# Patient Record
Sex: MALE | Race: WHITE | URBAN - METROPOLITAN AREA
[De-identification: names, ages, dates, MRNs, and addresses within clinical notes are randomized per-mention and may not be internally consistent; named-entity substitution may affect disease eponyms.]

---

## 2017-01-13 ENCOUNTER — MEDICAL CORRESPONDENCE (OUTPATIENT)
Dept: HEALTH INFORMATION MANAGEMENT | Facility: CLINIC | Age: 82
End: 2017-01-13

## 2017-01-20 ENCOUNTER — OFFICE VISIT (OUTPATIENT)
Dept: SURGERY | Facility: CLINIC | Age: 82
End: 2017-01-20
Payer: COMMERCIAL

## 2017-01-20 VITALS
WEIGHT: 175 LBS | BODY MASS INDEX: 25.05 KG/M2 | HEIGHT: 70 IN | SYSTOLIC BLOOD PRESSURE: 158 MMHG | DIASTOLIC BLOOD PRESSURE: 72 MMHG

## 2017-01-20 DIAGNOSIS — K40.90 LEFT INGUINAL HERNIA: Primary | ICD-10-CM

## 2017-01-20 PROCEDURE — 99204 OFFICE O/P NEW MOD 45 MIN: CPT | Performed by: SURGERY

## 2017-01-20 NOTE — PROGRESS NOTES
General Surgery Clinic Consultation      HPI:  Ismael is a 82 year old male who presents for consultation reffered by Dr. Minaya who presents for evaluation of left groin bulge.  He first a bulge in . At that time he had an ultrasound and then an MRI and was diagnosed with an AAA. This was treated with an EVAR. He continued to have a left groin bulge but it was asymptomatic until the last few weeks. He now has a bulge that is present all day and is much larger. He is unable to reduce it manually but when he lays down for a significant period it will reduce on its own. He has some mild discomfort associated with the bulge. He has also been having issues with constipation and feels it is related to his hernia as when it is reduced he is able to more easily have a bowel movement. He denies history of obstructive symptoms. He has had intermittent issues with abdominal pain, that resolve when lying flat.     Constipation: Yes  Colonoscopy: Yes   Dysuria: No  Cough: No  Diabetes: No  Current smoker: No  Employment does not require heavy lifting.    Past Medical History:   has a past medical history of Abdominal aortic aneurysm (AAA) (H) and Hypertension.    Past Surgical History:  Past Surgical History   Procedure Laterality Date     Cardiac surgery       Stent        Additional operations: EVAR, vasectomy    Social History:  Social History     Social History     Marital Status:      Spouse Name: N/A     Number of Children: N/A     Years of Education: N/A     Occupational History     Not on file.     Social History Main Topics     Smoking status: Former Smoker     Smokeless tobacco: Not on file     Alcohol Use: Not on file     Drug Use: Not on file     Sexual Activity: Not on file     Other Topics Concern     Not on file     Social History Narrative     No narrative on file    Drinks etoh socially. Retired, used to work as a     Family History:  Reviewed - mother  at age 93, father  from ACS at  72  Hernias: No    ROS:  The 10 point review of systems is negative other than noted in the HPI and above.    PE:    General- Well-developed, well-nourished, patient able to get up on table without difficulty.  HEENT- Normocephalic and atraumatic. Pupils equal and round.  Mucous membranes moist.  Sclera are nonicteric.  Neck- No lymphadenopathy or masses   Respirations- are regular and non labored  Abdomen- abdomen is soft without significant tenderness, masses, organomegaly or guarding  Hernia-  Large left inguinal hernia, not easily reducible, tender with palpation              Right inguinal exam is normal              Testes are descended bilaterally and normal    Assessment: Primary, partially reducible left inguinal hernia.    Plan:    We have discussed observation, reduction techniques and importance, incarceration and strangulation signs, symptoms and importance as well as need to seek emergency treatment.      We have had a detailed discussion regarding the nature of hernias, surgery, indications, alternatives, risks, benefits, incisions, scarring, anesthesia, recovery, mesh, infection, bleeding, numbness, nerve damage and chronic pain, testicular loss, hernia recurrence, lifting and activity limitations after surgery.  All questions have been answered to the best of my ability.    We will schedule surgery at the patient's convenience.He will hold his aspirin and advil for a week prior to surgery. Given his symptoms I recommend more urgent repair of his left hernia. We will plan on an open repair given the longstanding nature of the hernia and size.     Time spent with the patient with greater that 50% of the time in discussion was 40 minutes.     Yue Lindquist MD    Please route or send letter to:  Primary Care Provider (PCP) and Referring Provider          HPI      ROS      Physical Exam

## 2017-01-20 NOTE — Clinical Note
2017    General Surgery Clinic Consultation    RE:  Ismael Lombardo-:  [birth year]      HPI:  Ismael is a 82 year old male who presents for consultation reffered by Dr. Minaya who presents for evaluation of left groin bulge.  He first a bulge in . At that time he had an ultrasound and then an MRI and was diagnosed with an AAA. This was treated with an EVAR. He continued to have a left groin bulge but it was asymptomatic until the last few weeks. He now has a bulge that is present all day and is much larger. He is unable to reduce it manually but when he lays down for a significant period it will reduce on its own. He has some mild discomfort associated with the bulge. He has also been having issues with constipation and feels it is related to his hernia as when it is reduced he is able to more easily have a bowel movement. He denies history of obstructive symptoms. He has had intermittent issues with abdominal pain, that resolve when lying flat.     Constipation: Yes  Colonoscopy: Yes   Dysuria: No  Cough: No  Diabetes: No  Current smoker: No  Employment does not require heavy lifting.    Past Medical History:  Has a past medical history of Abdominal aortic aneurysm (AAA) (H) and Hypertension.    Family History:  Reviewed - mother  at age 93, father  from ACS at 72  Hernias: No    ROS:  The 10 point review of systems is negative other than noted in the HPI and above.    PE:    General- Well-developed, well-nourished, patient able to get up on table without difficulty.  HEENT- Normocephalic and atraumatic. Pupils equal and round.  Mucous membranes moist.  Sclera are nonicteric.  Neck- No lymphadenopathy or masses   Respirations- are regular and non labored  Abdomen- abdomen is soft without significant tenderness, masses, organomegaly or guarding  Hernia-  Large left inguinal hernia, not easily reducible, tender with palpation              Right inguinal exam is normal              Testes are  descended bilaterally and normal    Assessment: Primary, partially reducible left inguinal hernia.    Plan:    We have discussed observation, reduction techniques and importance, incarceration and strangulation signs, symptoms and importance as well as need to seek emergency treatment.      We have had a detailed discussion regarding the nature of hernias, surgery, indications, alternatives, risks, benefits, incisions, scarring, anesthesia, recovery, mesh, infection, bleeding, numbness, nerve damage and chronic pain, testicular loss, hernia recurrence, lifting and activity limitations after surgery.  All questions have been answered to the best of my ability.    We will schedule surgery at the patient's convenience.He will hold his aspirin and advil for a week prior to surgery. Given his symptoms I recommend more urgent repair of his left hernia. We will plan on an open repair given the longstanding nature of the hernia and size.         Yue Lindquist MD

## 2017-01-20 NOTE — MR AVS SNAPSHOT
"              After Visit Summary   2017    Ismael Lombardo    MRN: [id number]           Patient Information     Date Of Birth          3/22/1934        Visit Information        Provider Department      2017 2:00 PM Yue Lindquist MD [facility]      Today's Diagnoses     Left inguinal hernia    -  1        Follow-ups after your visit        Who to contact     If you have questions or need follow up information about today's clinic visit or your schedule please contact [facility] directly at [phone].  Normal or non-critical lab and imaging results will be communicated to you by [employer], letter or phone within 4 business days after the clinic has received the results. If you do not hear from us within 7 days, please contact the clinic through [employer] or phone. If you have a critical or abnormal lab result, we will notify you by phone as soon as possible.  Submit refill requests through [employer] or call your pharmacy and they will forward the refill request to us. Please allow 3 business days for your refill to be completed.          Additional Information About Your Visit        MyChart Information     [employer] lets you send messages to your doctor, view your test results, renew your prescriptions, schedule appointments and more. To sign up, go to [email or web address] . Click on \"Log in\" on the left side of the screen, which will take you to the Welcome page. Then click on \"Sign up Now\" on the right side of the page.     You will be asked to enter the access code listed below, as well as some personal information. Please follow the directions to create your username and password.     Your access code is: [email or web address]  Expires: 2017  2:41 PM     Your access code will  in 90 days. If you need help or a new code, please call your [place] clinic or [phone].        Care EveryWhere ID     This is your Care " "EveryWhere ID. This could be used by other organizations to access your [facility] medical records  [id number]        Your Vitals Were     Height BMI (Body Mass Index)                5' 10\" (1.778 m) 25.11 kg/m2           Blood Pressure from Last 3 Encounters:   01/20/17 158/72    Weight from Last 3 Encounters:   01/20/17 175 lb (79.379 kg)              Today, you had the following     No orders found for display       Primary Care Provider Office Phone # Fax #    Jem Minaya [phone]       [facility] [address] [place] [address]        Thank you!     Thank you for choosing [facility]  for your care. Our goal is always to provide you with excellent care. Hearing back from our patients is one way we can continue to improve our services. Please take a few minutes to complete the written survey that you may receive in the mail after your visit with us. Thank you!             Your Updated Medication List - Protect others around you: Learn how to safely use, store and throw away your medicines at www.disposemymeds.org.          This list is accurate as of: 1/20/17  2:41 PM.  Always use your most recent med list.                   Brand Name Dispense Instructions for use    ATENOLOL PO      Take 20 mg by mouth       ATORVASTATIN CALCIUM PO          LOSARTAN POTASSIUM PO          PREDNISONE PO            "

## 2017-01-24 ENCOUNTER — TRANSFERRED RECORDS (OUTPATIENT)
Dept: HEALTH INFORMATION MANAGEMENT | Facility: CLINIC | Age: 82
End: 2017-01-24

## 2017-01-25 ENCOUNTER — APPOINTMENT (OUTPATIENT)
Dept: SURGERY | Facility: PHYSICIAN GROUP | Age: 82
End: 2017-01-25
Payer: COMMERCIAL

## 2017-01-25 ENCOUNTER — ANESTHESIA (OUTPATIENT)
Dept: SURGERY | Facility: CLINIC | Age: 82
End: 2017-01-25
Payer: MEDICARE

## 2017-01-25 ENCOUNTER — SURGERY (OUTPATIENT)
Age: 82
End: 2017-01-25

## 2017-01-25 ENCOUNTER — ANESTHESIA EVENT (OUTPATIENT)
Dept: SURGERY | Facility: CLINIC | Age: 82
End: 2017-01-25
Payer: MEDICARE

## 2017-01-25 ENCOUNTER — HOSPITAL ENCOUNTER (OUTPATIENT)
Facility: CLINIC | Age: 82
Discharge: HOME | End: 2017-01-25
Attending: SURGERY | Admitting: SURGERY
Payer: MEDICARE

## 2017-01-25 VITALS
SYSTOLIC BLOOD PRESSURE: 144 MMHG | OXYGEN SATURATION: 96 % | BODY MASS INDEX: 25.11 KG/M2 | TEMPERATURE: 98.4 F | RESPIRATION RATE: 16 BRPM | DIASTOLIC BLOOD PRESSURE: 73 MMHG | WEIGHT: 175 LBS

## 2017-01-25 DIAGNOSIS — G89.18 ACUTE POST-OPERATIVE PAIN: ICD-10-CM

## 2017-01-25 DIAGNOSIS — K40.90 LEFT INGUINAL HERNIA: Primary | ICD-10-CM

## 2017-01-25 PROCEDURE — 36000052 ZZH SURGERY LEVEL 2 EA 15 ADDTL MIN: Performed by: SURGERY

## 2017-01-25 PROCEDURE — 25000125 ZZHC RX 250: Performed by: SURGERY

## 2017-01-25 PROCEDURE — 25000128 H RX IP 250 OP 636: Performed by: NURSE ANESTHETIST, CERTIFIED REGISTERED

## 2017-01-25 PROCEDURE — 27210995 ZZH RX 272: Performed by: SURGERY

## 2017-01-25 PROCEDURE — 37000008 ZZH ANESTHESIA TECHNICAL FEE, 1ST 30 MIN: Performed by: SURGERY

## 2017-01-25 PROCEDURE — 71000027 ZZH RECOVERY PHASE 2 EACH 15 MINS: Performed by: SURGERY

## 2017-01-25 PROCEDURE — 49505 PRP I/HERN INIT REDUC >5 YR: CPT | Performed by: SURGERY

## 2017-01-25 PROCEDURE — 36000050 ZZH SURGERY LEVEL 2 1ST 30 MIN: Performed by: SURGERY

## 2017-01-25 PROCEDURE — 71000012 ZZH RECOVERY PHASE 1 LEVEL 1 FIRST HR: Performed by: SURGERY

## 2017-01-25 PROCEDURE — C1781 MESH (IMPLANTABLE): HCPCS | Performed by: SURGERY

## 2017-01-25 PROCEDURE — 27210794 ZZH OR GENERAL SUPPLY STERILE: Performed by: SURGERY

## 2017-01-25 PROCEDURE — 37000009 ZZH ANESTHESIA TECHNICAL FEE, EACH ADDTL 15 MIN: Performed by: SURGERY

## 2017-01-25 PROCEDURE — 25800025 ZZH RX 258: Performed by: NURSE ANESTHETIST, CERTIFIED REGISTERED

## 2017-01-25 PROCEDURE — 25000125 ZZHC RX 250: Performed by: NURSE ANESTHETIST, CERTIFIED REGISTERED

## 2017-01-25 PROCEDURE — 40000170 ZZH STATISTIC PRE-PROCEDURE ASSESSMENT II: Performed by: SURGERY

## 2017-01-25 DEVICE — MESH ULTRAPRO 06X06": Type: IMPLANTABLE DEVICE | Site: INGUINAL | Status: FUNCTIONAL

## 2017-01-25 RX ORDER — EPHEDRINE SULFATE 50 MG/ML
INJECTION, SOLUTION INTRAMUSCULAR; INTRAVENOUS; SUBCUTANEOUS PRN
Status: DISCONTINUED | OUTPATIENT
Start: 2017-01-25 | End: 2017-01-25

## 2017-01-25 RX ORDER — PROPOFOL 10 MG/ML
INJECTION, EMULSION INTRAVENOUS CONTINUOUS PRN
Status: DISCONTINUED | OUTPATIENT
Start: 2017-01-25 | End: 2017-01-25

## 2017-01-25 RX ORDER — SODIUM CHLORIDE, SODIUM LACTATE, POTASSIUM CHLORIDE, CALCIUM CHLORIDE 600; 310; 30; 20 MG/100ML; MG/100ML; MG/100ML; MG/100ML
1000 INJECTION, SOLUTION INTRAVENOUS CONTINUOUS
Status: DISCONTINUED | OUTPATIENT
Start: 2017-01-25 | End: 2017-01-25 | Stop reason: HOSPADM

## 2017-01-25 RX ORDER — PROPOFOL 10 MG/ML
INJECTION, EMULSION INTRAVENOUS PRN
Status: DISCONTINUED | OUTPATIENT
Start: 2017-01-25 | End: 2017-01-25

## 2017-01-25 RX ORDER — SODIUM CHLORIDE, SODIUM LACTATE, POTASSIUM CHLORIDE, CALCIUM CHLORIDE 600; 310; 30; 20 MG/100ML; MG/100ML; MG/100ML; MG/100ML
INJECTION, SOLUTION INTRAVENOUS CONTINUOUS PRN
Status: DISCONTINUED | OUTPATIENT
Start: 2017-01-25 | End: 2017-01-25

## 2017-01-25 RX ORDER — PHYSOSTIGMINE SALICYLATE 1 MG/ML
1.2 INJECTION INTRAVENOUS
Status: DISCONTINUED | OUTPATIENT
Start: 2017-01-25 | End: 2017-01-25 | Stop reason: HOSPADM

## 2017-01-25 RX ORDER — HYDROCODONE BITARTRATE AND ACETAMINOPHEN 5; 325 MG/1; MG/1
1 TABLET ORAL
Status: DISCONTINUED | OUTPATIENT
Start: 2017-01-25 | End: 2017-01-25 | Stop reason: HOSPADM

## 2017-01-25 RX ORDER — ONDANSETRON 2 MG/ML
4 INJECTION INTRAMUSCULAR; INTRAVENOUS EVERY 30 MIN PRN
Status: DISCONTINUED | OUTPATIENT
Start: 2017-01-25 | End: 2017-01-25 | Stop reason: HOSPADM

## 2017-01-25 RX ORDER — BUPIVACAINE HYDROCHLORIDE 5 MG/ML
INJECTION, SOLUTION PERINEURAL PRN
Status: DISCONTINUED | OUTPATIENT
Start: 2017-01-25 | End: 2017-01-25 | Stop reason: HOSPADM

## 2017-01-25 RX ORDER — ONDANSETRON 4 MG/1
4 TABLET, ORALLY DISINTEGRATING ORAL EVERY 30 MIN PRN
Status: DISCONTINUED | OUTPATIENT
Start: 2017-01-25 | End: 2017-01-25 | Stop reason: HOSPADM

## 2017-01-25 RX ORDER — HYDRALAZINE HYDROCHLORIDE 20 MG/ML
2.5-5 INJECTION INTRAMUSCULAR; INTRAVENOUS EVERY 10 MIN PRN
Status: DISCONTINUED | OUTPATIENT
Start: 2017-01-25 | End: 2017-01-25 | Stop reason: HOSPADM

## 2017-01-25 RX ORDER — MEPERIDINE HYDROCHLORIDE 25 MG/ML
12.5 INJECTION INTRAMUSCULAR; INTRAVENOUS; SUBCUTANEOUS
Status: DISCONTINUED | OUTPATIENT
Start: 2017-01-25 | End: 2017-01-25 | Stop reason: HOSPADM

## 2017-01-25 RX ORDER — ONDANSETRON 2 MG/ML
INJECTION INTRAMUSCULAR; INTRAVENOUS PRN
Status: DISCONTINUED | OUTPATIENT
Start: 2017-01-25 | End: 2017-01-25

## 2017-01-25 RX ORDER — FENTANYL CITRATE 50 UG/ML
INJECTION, SOLUTION INTRAMUSCULAR; INTRAVENOUS PRN
Status: DISCONTINUED | OUTPATIENT
Start: 2017-01-25 | End: 2017-01-25

## 2017-01-25 RX ORDER — CEFAZOLIN SODIUM 1 G/3ML
1 INJECTION, POWDER, FOR SOLUTION INTRAMUSCULAR; INTRAVENOUS SEE ADMIN INSTRUCTIONS
Status: DISCONTINUED | OUTPATIENT
Start: 2017-01-25 | End: 2017-01-25 | Stop reason: HOSPADM

## 2017-01-25 RX ORDER — CEFAZOLIN SODIUM 2 G/100ML
2 INJECTION, SOLUTION INTRAVENOUS
Status: COMPLETED | OUTPATIENT
Start: 2017-01-25 | End: 2017-01-25

## 2017-01-25 RX ORDER — AMOXICILLIN 250 MG
1-2 CAPSULE ORAL 2 TIMES DAILY
Qty: 10 TABLET | Refills: 0 | Status: SHIPPED | OUTPATIENT
Start: 2017-01-25

## 2017-01-25 RX ORDER — FENTANYL CITRATE 50 UG/ML
25-50 INJECTION, SOLUTION INTRAMUSCULAR; INTRAVENOUS
Status: DISCONTINUED | OUTPATIENT
Start: 2017-01-25 | End: 2017-01-25 | Stop reason: HOSPADM

## 2017-01-25 RX ORDER — FENTANYL CITRATE 50 UG/ML
25-50 INJECTION, SOLUTION INTRAMUSCULAR; INTRAVENOUS EVERY 5 MIN PRN
Status: DISCONTINUED | OUTPATIENT
Start: 2017-01-25 | End: 2017-01-25 | Stop reason: HOSPADM

## 2017-01-25 RX ORDER — NALOXONE HYDROCHLORIDE 0.4 MG/ML
.1-.4 INJECTION, SOLUTION INTRAMUSCULAR; INTRAVENOUS; SUBCUTANEOUS
Status: DISCONTINUED | OUTPATIENT
Start: 2017-01-25 | End: 2017-01-25 | Stop reason: HOSPADM

## 2017-01-25 RX ORDER — MAGNESIUM HYDROXIDE 1200 MG/15ML
LIQUID ORAL PRN
Status: DISCONTINUED | OUTPATIENT
Start: 2017-01-25 | End: 2017-01-25 | Stop reason: HOSPADM

## 2017-01-25 RX ORDER — LIDOCAINE HYDROCHLORIDE 20 MG/ML
INJECTION, SOLUTION INFILTRATION; PERINEURAL PRN
Status: DISCONTINUED | OUTPATIENT
Start: 2017-01-25 | End: 2017-01-25

## 2017-01-25 RX ORDER — HYDROCODONE BITARTRATE AND ACETAMINOPHEN 5; 325 MG/1; MG/1
1 TABLET ORAL EVERY 4 HOURS PRN
Qty: 30 TABLET | Refills: 0 | Status: SHIPPED | OUTPATIENT
Start: 2017-01-25 | End: 2017-02-10

## 2017-01-25 RX ADMIN — PHENYLEPHRINE HYDROCHLORIDE 100 MCG: 10 INJECTION, SOLUTION INTRAMUSCULAR; INTRAVENOUS; SUBCUTANEOUS at 14:12

## 2017-01-25 RX ADMIN — LIDOCAINE HYDROCHLORIDE 60 MG: 20 INJECTION, SOLUTION INFILTRATION; PERINEURAL at 13:43

## 2017-01-25 RX ADMIN — CEFAZOLIN SODIUM 2 G: 2 INJECTION, SOLUTION INTRAVENOUS at 13:49

## 2017-01-25 RX ADMIN — Medication 5 MG: at 14:01

## 2017-01-25 RX ADMIN — PROPOFOL 200 MCG/KG/MIN: 10 INJECTION, EMULSION INTRAVENOUS at 13:45

## 2017-01-25 RX ADMIN — ONDANSETRON 4 MG: 2 INJECTION INTRAMUSCULAR; INTRAVENOUS at 13:51

## 2017-01-25 RX ADMIN — Medication 5 MG: at 14:12

## 2017-01-25 RX ADMIN — BUPIVACAINE HYDROCHLORIDE 10 ML: 5 INJECTION, SOLUTION PERINEURAL at 14:19

## 2017-01-25 RX ADMIN — SODIUM CHLORIDE, POTASSIUM CHLORIDE, SODIUM LACTATE AND CALCIUM CHLORIDE: 600; 310; 30; 20 INJECTION, SOLUTION INTRAVENOUS at 13:39

## 2017-01-25 RX ADMIN — FENTANYL CITRATE 50 MCG: 50 INJECTION, SOLUTION INTRAMUSCULAR; INTRAVENOUS at 13:55

## 2017-01-25 RX ADMIN — FENTANYL CITRATE 50 MCG: 50 INJECTION, SOLUTION INTRAMUSCULAR; INTRAVENOUS at 13:41

## 2017-01-25 RX ADMIN — PHENYLEPHRINE HYDROCHLORIDE 100 MCG: 10 INJECTION, SOLUTION INTRAMUSCULAR; INTRAVENOUS; SUBCUTANEOUS at 14:25

## 2017-01-25 RX ADMIN — PHENYLEPHRINE HYDROCHLORIDE 100 MCG: 10 INJECTION, SOLUTION INTRAMUSCULAR; INTRAVENOUS; SUBCUTANEOUS at 14:06

## 2017-01-25 RX ADMIN — SODIUM CHLORIDE 1000 ML: 0.9 IRRIGANT IRRIGATION at 13:56

## 2017-01-25 RX ADMIN — PROPOFOL 50 MG: 10 INJECTION, EMULSION INTRAVENOUS at 13:45

## 2017-01-25 RX ADMIN — PHENYLEPHRINE HYDROCHLORIDE 150 MCG: 10 INJECTION, SOLUTION INTRAMUSCULAR; INTRAVENOUS; SUBCUTANEOUS at 13:51

## 2017-01-25 RX ADMIN — Medication 5 MG: at 13:51

## 2017-01-25 RX ADMIN — PHENYLEPHRINE HYDROCHLORIDE 100 MCG: 10 INJECTION, SOLUTION INTRAMUSCULAR; INTRAVENOUS; SUBCUTANEOUS at 14:01

## 2017-01-25 RX ADMIN — PROPOFOL 150 MG: 10 INJECTION, EMULSION INTRAVENOUS at 13:43

## 2017-01-25 RX ADMIN — PHENYLEPHRINE HYDROCHLORIDE 100 MCG: 10 INJECTION, SOLUTION INTRAMUSCULAR; INTRAVENOUS; SUBCUTANEOUS at 13:49

## 2017-01-25 RX ADMIN — PHENYLEPHRINE HYDROCHLORIDE 100 MCG: 10 INJECTION, SOLUTION INTRAMUSCULAR; INTRAVENOUS; SUBCUTANEOUS at 13:58

## 2017-01-25 ASSESSMENT — LIFESTYLE VARIABLES: TOBACCO_USE: 1

## 2017-01-25 NOTE — ANESTHESIA PREPROCEDURE EVALUATION
Anesthesia Evaluation     . Pt has had prior anesthetic.     No history of anesthetic complications     ROS/MED HX    ENT/Pulmonary:     (+)tobacco use, Past use , . .    Neurologic:       Cardiovascular:     (+) Dyslipidemia, hypertension-Peripheral Vascular Disease-- Other and Non Symptomatic, CAD, --stent,. : . . . :. Irregular Heartbeat/Palpitations, .       METS/Exercise Tolerance:     Hematologic:         Musculoskeletal:   (+) arthritis, , , -       GI/Hepatic:  - neg GI/hepatic ROS       Renal/Genitourinary:         Endo:     (+) Chronic steroid usage for Arthritis .      Psychiatric:         Infectious Disease:         Malignancy:         Other:               Physical Exam  Normal systems: cardiovascular, pulmonary and dental    Airway   Mallampati: II  TM distance: >3 FB  Neck ROM: full    Dental     Cardiovascular   Rhythm and rate: regular and normal      Pulmonary    breath sounds clear to auscultation                    Anesthesia Plan      History & Physical Review  History and physical reviewed and following examination; no interval change.    ASA Status:  3 .    NPO Status:  > 8 hours    Plan for General and LMA with Propofol induction. Maintenance will be Inhalation.    PONV prophylaxis:  Ondansetron (or other 5HT-3) and Dexamethasone or Solumedrol       Postoperative Care  Postoperative pain management:  IV analgesics and Oral pain medications.      Consents  Anesthetic plan, risks, benefits and alternatives discussed with:  Patient..                          .

## 2017-01-25 NOTE — DISCHARGE INSTRUCTIONS
Same Day Surgery Discharge Instructions for  Sedation and General Anesthesia       It's not unusual to feel dizzy, light-headed or faint for up to 24 hours after surgery or while taking pain medication.  If you have these symptoms: sit for a few minutes before standing and have someone assist you when you get up to walk or use the bathroom.      You should rest and relax for the next 24 hours. We recommend you make arrangements to have an adult stay with you for at least 24 hours after your discharge.  Avoid hazardous and strenuous activity.      DO NOT DRIVE any vehicle or operate mechanical equipment for 24 hours following the end of your surgery.  Even though you may feel normal, your reactions may be affected by the medication you have received.      Do not drink alcoholic beverages for 24 hours following surgery.       Slowly progress to your regular diet as you feel able. It's not unusual to feel nauseated and/or vomit after receiving anesthesia.  If you develop these symptoms, drink clear liquids (apple juice, ginger ale, broth, 7-up, etc. ) until you feel better.  If your nausea and vomiting persists for 24 hours, please notify your surgeon.        All narcotic pain medications, along with inactivity and anesthesia, can cause constipation. Drinking plenty of liquids and increasing fiber intake will help.      For any questions of a medical nature, call your surgeon.      Do not make important decisions for 24 hours.      If you had general anesthesia, you may have a sore throat for a couple of days related to the breathing tube used during surgery.  You may use Cepacol lozenges to help with this discomfort.  If it worsens or if you develop a fever, contact your surgeon.       If you feel your pain is not well managed with the pain medications prescribed by your surgeon, please contact your surgeon's office to let them know so they can address your concerns.     **If you have concerns or questions about your  procedure,    please contact Dr Lindquist at  [phone]**    [facility]   Discharge Instructions: Post-Operative Hernia  [facility]  DIET    No restrictions.      Suggest plenty of liquids and high fiber foods to keep stools soft.      May take 1 oz. (2 tablespoons) Milk of Magnesia the evening following surgery to encourage bowel movement.    BANDAGE    Take the bandage off the 48 hours after surgery.      If you have tape strips leave them on.  If they become loose, take them off.      Apply ice to groin periodically during the first 48 hours after surgery.    SHOWER    You may shower after the bandage is off.  Pat the incision dry.    ACTIVITY    You may do what is comfortable.    It is not wise to lift weights, or do anything that requires maximal physical effort for several weeks.      Individual restrictions should be discussed with your surgeon.    You may drive when you are comfortable enough to drive safely.  (Usually 3-4 days.)    WORK      You may return to non-physical work whenever you are comfortable.  (If you can drive, you probably can work.)  Physical work will be decided individually.    PAIN    You may have post-operative pain for several days.    Use the pain medication as directed at your discretion.    Expect gradual resolution of pain over several days.    Ice 20 min at a time for few times a day the first 2-3 days.  After this, you may benefit from heating pad instead of ice pack.    EXPECTATIONS    You can expect: some swelling; black and blue areas possibly involving the testicles and penis; some numbness.  These are not dangerous.  Wearing an athletic supporter the first few days, may help with swelling.    RETURN APPOINTMENT    Please make your post-operative appointment for 10-15 days after surgery.      We recommend making this appointment soon after your surgery date has been confirmed.    CALL OUR CLINICAL OFFICE AT [phone] IF YOU  HAVE:    Fever or chills    Drainage from the incision(s)    Significant bleeding    Increasing pain after the first 36 hours    Any other concerns

## 2017-01-25 NOTE — BRIEF OP NOTE
[facility] Brief Operative Note    Pre-operative diagnosis: LEFT INGUINAL HERNIA   Post-operative diagnosis Left Inguinal Hernia     Procedure: Procedure(s):  OPEN LEFT INGUINAL HERNIA REPAIR WITH MESH - Wound Class: I-Clean   Surgeon(s): Surgeon(s) and Role:     * Yue Lindquist MD - Primary     * Rory Thornton PA-C - Assisting   Estimated blood loss: 10 mL    Specimens: * No specimens in log *   Findings: Direct Inguinal hernia  Ultrapro mesh, trimmed to fit  See Operative Report for full details

## 2017-01-25 NOTE — ANESTHESIA CARE TRANSFER NOTE
Patient: Ismael Lombardo    HERNIORRHAPHY INGUINAL (Left Groin)  Additional InformationProcedure(s):  OPEN LEFT INGUINAL HERNIA REPAIR WITH MESH - Wound Class: I-Clean    Diagnosis: LEFT INGUINAL HERNIA  Diagnosis Additional Information: No value filed.    Anesthesia Type:   General, LMA     Note:  Airway :Face Mask  Patient transferred to:PACU  Comments: Awake, alert, VSS, report to RN, monitors and alarms on, IV patent.      Vitals: (Last set prior to Anesthesia Care Transfer)              Electronically Signed By: COLLETTE Boss CRNA  January 25, 2017  2:49 PM

## 2017-01-25 NOTE — OP NOTE
General Surgery Operative Note    PREOPERATIVE DIAGNOSIS: Left  inguinal hernia.   POSTOPERATIVE DIAGNOSIS: Left inguinal hernia.   PROCEDURE: Left  inguinal hernia repair with mesh.   SURGEON: Yue Lindquist MD   ASSISTANT: Roge Thornton PA-C  ANESTHESIA: General   ESTIMATED BLOOD LOSS: 10 mL.   COMPLICATIONS: None.   SPECIMENS: None.   OPERATIVE INDICATIONS: Mr. Lombardo has a symptomatic Left  inguinal hernia. Options were discussed and it was elected to proceed with open repair. Potential risks of bleeding, infection, neurovascular injury to the vas deferens or testicle, recurrent hernia, chronic pain were all reviewed in detail and he wished to proceed.   DESCRIPTION OF PROCEDURE: After informed consent was obtained, the patient was taken to the operating room and placed supine on the operating table. Following the induction of adequate general anesthetic, the patient's Left  groin was shaved, prepped and draped in the usual fashion. A timeout was then completed verifying the correct patient, procedure, site, and surgeon and all in the room were in agreement. IV antibiotics were administered. 1%lidocaine mixed with 0.5 %bupivicaine was injected into the left groin. A standard groin incision was then made and dissection was carried down to the external oblique fascia. This was sharply incised and the inguinal canal was exposed. The spermatic cord and its contents were mobilized and encircled with a Penrose drain. A large sized direct hernia was present. The sac was meticulously teased off of the spermatic cord. It was reduced intraabdominally.  The direct floor was re approximated with multiple 2-0 Vicryl's in interrupted fashion to contain the direct hernia. A medium sized  Ultra pro lightweight prolene mesh was placed in the usual fashion. The keyhole was fashioned around the spermatic cord without difficulty. It was anchored at the pubic tubercle with a  2-0 Prolene and along the shelving edge of the inguinal  ligament. It was secured to the transversalis fascia superiorly with interrupted 2-0 prolene sutures. It was tucked under the external fascia very nicely. There was good coverage over the entire hernia. The external oblique was closed using running 3.0 Vicryl stitch. The operative area was infiltrated with local anesthetic. The deep subcutaneous was closed with 3-0 Vicryl stitch. Skin incision was closed with subcuticular 4-0 Monocryl stitch. Steri-Strips were applied. Needle and sponge counts were correct. The patient tolerated the procedure well and was taken from the operating room to the recovery room in stable condition.     Yue Lindquist MD

## 2017-01-25 NOTE — ANESTHESIA POSTPROCEDURE EVALUATION
Patient: Ismael Lombardo    HERNIORRHAPHY INGUINAL (Left Groin)  Additional InformationProcedure(s):  OPEN LEFT INGUINAL HERNIA REPAIR WITH MESH - Wound Class: I-Clean    Diagnosis:LEFT INGUINAL HERNIA  Diagnosis Additional Information: No value filed.    Anesthesia Type:  General, LMA    Note:  Anesthesia Post Evaluation    Patient location during evaluation: PACU  Patient participation: Able to fully participate in evaluation  Level of consciousness: awake  Pain management: adequate  Airway patency: patent  Cardiovascular status: acceptable  Respiratory status: acceptable  Hydration status: acceptable  PONV: none     Anesthetic complications: None          Last vitals:  Filed Vitals:    01/25/17 1447 01/25/17 1500 01/25/17 1504   BP: 116/57 122/63 122/63   Temp: 37.2  C (98.9  F)     Resp: 16 15 14   SpO2:  92% 92%       Electronically Signed By: Arturo Rubin MD  January 25, 2017  3:07 PM

## 2017-01-25 NOTE — IP AVS SNAPSHOT
[facility] Same Day Surgery    [address]    [place] [address]    Phone:  [phone]    Fax:  [phone]                                       After Visit Summary   1/25/2017    Ismael Lombardo    MRN: [id number]           After Visit Summary Signature Page     I have received my discharge instructions, and my questions have been answered. I have discussed any challenges I see with this plan with the nurse or doctor.    ..........................................................................................................................................  Patient/Patient Representative Signature      ..........................................................................................................................................  Patient Representative Print Name and Relationship to Patient    ..................................................               ................................................  Date                                            Time    ..........................................................................................................................................  Reviewed by Signature/Title    ...................................................              ..............................................  Date                                                            Time

## 2017-01-25 NOTE — IP AVS SNAPSHOT
MRN:[id number]                      After Visit Summary   1/25/2017    Ismael Lombardo    MRN: [id number]           Thank you!     Thank you for choosing [facility] for your care. Our goal is always to provide you with excellent care. Hearing back from our patients is one way we can continue to improve our services. Please take a few minutes to complete the written survey that you may receive in the mail after you visit with us. Thank you!        Patient Information     Date Of Birth          3/22/1934        About your hospital stay     You were admitted on:  January 25, 2017 You last received care in the:  [facility]    You were discharged on:  January 25, 2017       Who to Call     For medical emergencies, please call 911.  For non-urgent questions about your medical care, please call your primary care provider or clinic, [phone]  For questions related to your surgery, please call your surgery clinic        Attending Provider     Provider    Yue Lindquist MD       Primary Care Provider Office Phone # Fax #    Videh Marimar [phone]       [facility] [address] [place]  [place] [facility] [address]        After Care Instructions     Diet Instructions       Resume pre-procedure diet            Discharge Instructions       Patient to follow up with appointment in ~2 weeks            Ice to affected area       Ice to operative site PRN            No lifting        No lifting over 10 lbs and no strenuous physical activity for 2 weeks            Shower        No shower for 24 hours post procedure. May shower POD 1                  Further instructions from your care team       Same Day Surgery Discharge Instructions for  Sedation and General Anesthesia       It's not unusual to feel dizzy, light-headed or faint for up to 24 hours after surgery or while taking pain medication.  If you have these symptoms: sit for a few minutes before standing and  have someone assist you when you get up to walk or use the bathroom.      You should rest and relax for the next 24 hours. We recommend you make arrangements to have an adult stay with you for at least 24 hours after your discharge.  Avoid hazardous and strenuous activity.      DO NOT DRIVE any vehicle or operate mechanical equipment for 24 hours following the end of your surgery.  Even though you may feel normal, your reactions may be affected by the medication you have received.      Do not drink alcoholic beverages for 24 hours following surgery.       Slowly progress to your regular diet as you feel able. It's not unusual to feel nauseated and/or vomit after receiving anesthesia.  If you develop these symptoms, drink clear liquids (apple juice, ginger ale, broth, 7-up, etc. ) until you feel better.  If your nausea and vomiting persists for 24 hours, please notify your surgeon.        All narcotic pain medications, along with inactivity and anesthesia, can cause constipation. Drinking plenty of liquids and increasing fiber intake will help.      For any questions of a medical nature, call your surgeon.      Do not make important decisions for 24 hours.      If you had general anesthesia, you may have a sore throat for a couple of days related to the breathing tube used during surgery.  You may use Cepacol lozenges to help with this discomfort.  If it worsens or if you develop a fever, contact your surgeon.       If you feel your pain is not well managed with the pain medications prescribed by your surgeon, please contact your surgeon's office to let them know so they can address your concerns.     **If you have concerns or questions about your procedure,    please contact Dr Lindquist at  [phone]**    [facility]   Discharge Instructions: Post-Operative Hernia  [facility]  DIET    No restrictions.      Suggest plenty of liquids and high fiber foods to keep stools soft.       May take 1 oz. (2 tablespoons) Milk of Magnesia the evening following surgery to encourage bowel movement.    BANDAGE    Take the bandage off the 48 hours after surgery.      If you have tape strips leave them on.  If they become loose, take them off.      Apply ice to groin periodically during the first 48 hours after surgery.    SHOWER    You may shower after the bandage is off.  Pat the incision dry.    ACTIVITY    You may do what is comfortable.    It is not wise to lift weights, or do anything that requires maximal physical effort for several weeks.      Individual restrictions should be discussed with your surgeon.    You may drive when you are comfortable enough to drive safely.  (Usually 3-4 days.)    WORK      You may return to non-physical work whenever you are comfortable.  (If you can drive, you probably can work.)  Physical work will be decided individually.    PAIN    You may have post-operative pain for several days.    Use the pain medication as directed at your discretion.    Expect gradual resolution of pain over several days.    Ice 20 min at a time for few times a day the first 2-3 days.  After this, you may benefit from heating pad instead of ice pack.    EXPECTATIONS    You can expect: some swelling; black and blue areas possibly involving the testicles and penis; some numbness.  These are not dangerous.  Wearing an athletic supporter the first few days, may help with swelling.    RETURN APPOINTMENT    Please make your post-operative appointment for 10-15 days after surgery.      We recommend making this appointment soon after your surgery date has been confirmed.    CALL OUR CLINICAL OFFICE AT [phone] IF YOU HAVE:    Fever or chills    Drainage from the incision(s)    Significant bleeding    Increasing pain after the first 36 hours    Any other concerns                                  Pending Results     No orders found from 1/24/2017 to 1/26/2017.            Admission Information   "      Provider Department Dept Phone    2017 Yue Lindquist MD [facility] Phase [phone]      Your Vitals Were     Blood Pressure Temperature Respirations Weight Pulse Oximetry       141/72 mmHg 98.9  F (37.2  C) (Temporal) 15 79.379 kg (175 lb) 97%       MyChart Information     [employer] lets you send messages to your doctor, view your test results, renew your prescriptions, schedule appointments and more. To sign up, go to [email or web address] . Click on \"Log in\" on the left side of the screen, which will take you to the Welcome page. Then click on \"Sign up Now\" on the right side of the page.     You will be asked to enter the access code listed below, as well as some personal information. Please follow the directions to create your username and password.     Your access code is: [email or web address]  Expires: 2017  2:41 PM     Your access code will  in 90 days. If you need help or a new code, please call your [place] clinic or [phone].        Care EveryWhere ID     This is your Care EveryWhere ID. This could be used by other organizations to access your [place] medical records  [id number]           Review of your medicines      START taking        Dose / Directions    HYDROcodone-acetaminophen 5-325 MG per tablet   Commonly known as:  NORCO   Used for:  Left inguinal hernia, Acute post-operative pain        Dose:  1 tablet   Take 1 tablet by mouth every 4 hours as needed for other (Moderate to Severe Pain)   Quantity:  30 tablet   Refills:  0       senna-docusate 8.6-50 MG per tablet   Commonly known as:  SENOKOT-S;PERICOLACE   Used for:  Acute post-operative pain, Left inguinal hernia        Dose:  1-2 tablet   Take 1-2 tablets by mouth 2 times daily Take while on oral narcotics to prevent or treat constipation.   Quantity:  10 tablet   Refills:  0         CONTINUE these medicines which have NOT CHANGED        Dose / Directions    ATENOLOL PO        Dose:  25 mg   Take 25 mg by mouth "   Refills:  0       ATORVASTATIN CALCIUM PO        Dose:  20 mg   Take 20 mg by mouth daily   Refills:  0       LOSARTAN POTASSIUM PO        Dose:  12.5 mg   Take 12.5 mg by mouth daily   Refills:  0       PREDNISONE PO   Indication:  Polymyalgia Rheumatica        Dose:  2.5 mg   Take 2.5 mg by mouth daily   Refills:  0            Where to get your medicines      These medications were sent to [facility] [place], [place] - [address]  [address], [place] [address]     Phone:  [phone]    - senna-docusate 8.6-50 MG per tablet      Some of these will need a paper prescription and others can be bought over the counter. Ask your nurse if you have questions.     Bring a paper prescription for each of these medications    - HYDROcodone-acetaminophen 5-325 MG per tablet             Protect others around you: Learn how to safely use, store and throw away your medicines at www.disposemymeds.org.             Medication List: This is a list of all your medications and when to take them. Check marks below indicate your daily home schedule. Keep this list as a reference.      Medications           Morning Afternoon Evening Bedtime As Needed    ATENOLOL PO   Take 25 mg by mouth                                ATORVASTATIN CALCIUM PO   Take 20 mg by mouth daily                                HYDROcodone-acetaminophen 5-325 MG per tablet   Commonly known as:  NORCO   Take 1 tablet by mouth every 4 hours as needed for other (Moderate to Severe Pain)                                LOSARTAN POTASSIUM PO   Take 12.5 mg by mouth daily                                PREDNISONE PO   Take 2.5 mg by mouth daily                                senna-docusate 8.6-50 MG per tablet   Commonly known as:  SENOKOT-S;PERICOLACE   Take 1-2 tablets by mouth 2 times daily Take while on oral narcotics to prevent or treat constipation.

## 2017-02-10 ENCOUNTER — OFFICE VISIT (OUTPATIENT)
Dept: SURGERY | Facility: CLINIC | Age: 82
End: 2017-02-10
Payer: COMMERCIAL

## 2017-02-10 DIAGNOSIS — Z09 SURGERY FOLLOW-UP EXAMINATION: Primary | ICD-10-CM

## 2017-02-10 PROCEDURE — 99024 POSTOP FOLLOW-UP VISIT: CPT | Performed by: SURGERY

## 2017-02-10 NOTE — PROGRESS NOTES
Surgery Postoperative Note    S: Ismael Lombardo is recovering from an open left inguinal hernia repair on January 25, 2017.  He has been doing very well since his surgery.  He reports minimal pain.  He has been eating and having bowel movements regularly.    Abdomen: Left groin incision: Steri-Strips removed, incision healing well without erythema or induration.      A/P  Ismael Lombardo is recovering from a left Inguinal Hernia Repair. I advised him to slowly return to regular activity. I expect he will make a complete recovery without issues.     Thank you for the opportunity to help in his care.    Yue Lindquist  [facility]  [phone]    Please route or send letter to:  Primary Care Provider (PCP) and Referring Provider

## 2017-02-10 NOTE — LETTER
February 10, 2017    Surgery Postoperative Note    RE:  Ismael Lombardo-:  [birth year]     S: Ismael Lombardo is recovering from an open left inguinal hernia repair on 2017. He has been doing very well since his surgery. He reports minimal pain. He has been eating and having bowel movements regularly.     Abdomen: Left groin incision: Steri-Strips removed, incision healing well without erythema or induration.        A/P  Ismael Lombardo is recovering from a left inguinal hernia repair. I advised him to slowly return to regular activity. I expect he will make a complete recovery without issues.      Thank you for the opportunity to help in his care.     Yue Lindquist  [facility]  [phone]

## 2017-02-10 NOTE — MR AVS SNAPSHOT
"              After Visit Summary   2/10/2017    Ismael Lombardo    MRN: [id number]           Patient Information     Date Of Birth          3/22/1934        Visit Information        Provider Department      2/10/2017 2:30 PM Yue Lindquist MD [facility] [place] [facility]      Today's Diagnoses     Surgery follow-up examination    -  1        Follow-ups after your visit        Who to contact     If you have questions or need follow up information about today's clinic visit or your schedule please contact [facility] directly at [phone].  Normal or non-critical lab and imaging results will be communicated to you by [employer], letter or phone within 4 business days after the clinic has received the results. If you do not hear from us within 7 days, please contact the clinic through [employer] or phone. If you have a critical or abnormal lab result, we will notify you by phone as soon as possible.  Submit refill requests through [employer] or call your pharmacy and they will forward the refill request to us. Please allow 3 business days for your refill to be completed.          Additional Information About Your Visit        MyChart Information     [employer] lets you send messages to your doctor, view your test results, renew your prescriptions, schedule appointments and more. To sign up, go to [email or web address] . Click on \"Log in\" on the left side of the screen, which will take you to the Welcome page. Then click on \"Sign up Now\" on the right side of the page.     You will be asked to enter the access code listed below, as well as some personal information. Please follow the directions to create your username and password.     Your access code is: [email or web address]  Expires: 2017  2:41 PM     Your access code will  in 90 days. If you need help or a new code, please call your [facility] clinic or [phone].        Care EveryWhere ID     This is your " Care EveryWhere ID. This could be used by other organizations to access your [facility] medical records  [id number]         Blood Pressure from Last 3 Encounters:   01/25/17 144/73   01/20/17 158/72    Weight from Last 3 Encounters:   01/25/17 175 lb (79.379 kg)   01/20/17 175 lb (79.379 kg)              Today, you had the following     No orders found for display       Primary Care Provider Office Phone # Fax #    Jem Minaya [phone]       [facility] [address] [place] [facility] [address]        Thank you!     Thank you for choosing [facility]  for your care. Our goal is always to provide you with excellent care. Hearing back from our patients is one way we can continue to improve our services. Please take a few minutes to complete the written survey that you may receive in the mail after your visit with us. Thank you!             Your Updated Medication List - Protect others around you: Learn how to safely use, store and throw away your medicines at www.disposemymeds.org.          This list is accurate as of: 2/10/17  2:47 PM.  Always use your most recent med list.                   Brand Name Dispense Instructions for use    ATENOLOL PO      Take 25 mg by mouth       ATORVASTATIN CALCIUM PO      Take 20 mg by mouth daily       LOSARTAN POTASSIUM PO      Take 12.5 mg by mouth daily       PREDNISONE PO      Take 2.5 mg by mouth daily       senna-docusate 8.6-50 MG per tablet    SENOKOT-S;PERICOLACE    10 tablet    Take 1-2 tablets by mouth 2 times daily Take while on oral narcotics to prevent or treat constipation.

## (undated) DEVICE — DRAIN PENROSE 0.25"X18" LATEX FREE GR201

## (undated) DEVICE — PACK MINOR SBA15MIFSE

## (undated) DEVICE — DRSG GAUZE 4X4" 3033

## (undated) DEVICE — GLOVE PROTEXIS BLUE W/NEU-THERA 6.5  2D73EB65

## (undated) DEVICE — BLADE CLIPPER 4406

## (undated) DEVICE — SYR 10ML SLIP TIP W/O NDL

## (undated) DEVICE — DECANTER VIAL 2006S

## (undated) DEVICE — SYR EAR BULB 3OZ 0035830

## (undated) DEVICE — GLOVE PROTEXIS MICRO 6.0  2D73PM60

## (undated) DEVICE — SU VICRYL 2-0 SH 27" J317H

## (undated) DEVICE — SUCTION TIP YANKAUER W/O VENT K86

## (undated) DEVICE — ESU ELEC BLADE 2.75" COATED/INSULATED E1455

## (undated) DEVICE — SOL NACL 0.9% IRRIG 1000ML BOTTLE 07138-09

## (undated) DEVICE — LINEN TOWEL PACK X5 5464

## (undated) DEVICE — NDL 19GA 1.5"

## (undated) DEVICE — SU VICRYL 2-0 TIE 12X18" J905T

## (undated) DEVICE — SU VICRYL 3-0 SH 27" J316H

## (undated) DEVICE — GOWN IMPERVIOUS SPECIALTY XLG/XLONG 32474

## (undated) DEVICE — DRAPE LAP W/ARMBOARD 29410

## (undated) DEVICE — PREP CHLORAPREP 26ML TINTED ORANGE  260815

## (undated) DEVICE — DRSG STERI STRIP 1/2X4" R1547

## (undated) DEVICE — SU MONOCRYL 4-0 PS-2 18" UND Y496G

## (undated) RX ORDER — PROPOFOL 10 MG/ML
INJECTION, EMULSION INTRAVENOUS
Status: DISPENSED
Start: 2017-01-25

## (undated) RX ORDER — FENTANYL CITRATE 50 UG/ML
INJECTION, SOLUTION INTRAMUSCULAR; INTRAVENOUS
Status: DISPENSED
Start: 2017-01-25

## (undated) RX ORDER — ONDANSETRON 2 MG/ML
INJECTION INTRAMUSCULAR; INTRAVENOUS
Status: DISPENSED
Start: 2017-01-25

## (undated) RX ORDER — LIDOCAINE HYDROCHLORIDE 20 MG/ML
INJECTION, SOLUTION EPIDURAL; INFILTRATION; INTRACAUDAL; PERINEURAL
Status: DISPENSED
Start: 2017-01-25

## (undated) RX ORDER — CEFAZOLIN SODIUM 2 G/100ML
INJECTION, SOLUTION INTRAVENOUS
Status: DISPENSED
Start: 2017-01-25